# Patient Record
Sex: FEMALE | Race: BLACK OR AFRICAN AMERICAN | Employment: FULL TIME | URBAN - METROPOLITAN AREA
[De-identification: names, ages, dates, MRNs, and addresses within clinical notes are randomized per-mention and may not be internally consistent; named-entity substitution may affect disease eponyms.]

---

## 2020-11-06 ENCOUNTER — HOSPITAL ENCOUNTER (EMERGENCY)
Age: 25
Discharge: HOME | End: 2020-11-06
Attending: STUDENT IN AN ORGANIZED HEALTH CARE EDUCATION/TRAINING PROGRAM
Payer: MEDICAID

## 2020-11-06 ENCOUNTER — APPOINTMENT (OUTPATIENT)
Dept: GENERAL RADIOLOGY | Age: 25
End: 2020-11-06
Payer: MEDICAID

## 2020-11-06 VITALS
DIASTOLIC BLOOD PRESSURE: 85 MMHG | SYSTOLIC BLOOD PRESSURE: 121 MMHG | HEIGHT: 67 IN | TEMPERATURE: 98 F | OXYGEN SATURATION: 100 % | WEIGHT: 213 LBS | BODY MASS INDEX: 33.43 KG/M2 | HEART RATE: 62 BPM | RESPIRATION RATE: 16 BRPM

## 2020-11-06 LAB
RAPID INFLUENZA  B AGN: NEGATIVE
RAPID INFLUENZA A AGN: NEGATIVE

## 2020-11-06 PROCEDURE — 6370000000 HC RX 637 (ALT 250 FOR IP): Performed by: STUDENT IN AN ORGANIZED HEALTH CARE EDUCATION/TRAINING PROGRAM

## 2020-11-06 PROCEDURE — 87804 INFLUENZA ASSAY W/OPTIC: CPT

## 2020-11-06 PROCEDURE — 71045 X-RAY EXAM CHEST 1 VIEW: CPT

## 2020-11-06 PROCEDURE — 99283 EMERGENCY DEPT VISIT LOW MDM: CPT

## 2020-11-06 PROCEDURE — 2580000003 HC RX 258: Performed by: STUDENT IN AN ORGANIZED HEALTH CARE EDUCATION/TRAINING PROGRAM

## 2020-11-06 RX ORDER — BENZONATATE 100 MG/1
100 CAPSULE ORAL ONCE
Status: COMPLETED | OUTPATIENT
Start: 2020-11-06 | End: 2020-11-06

## 2020-11-06 RX ORDER — 0.9 % SODIUM CHLORIDE 0.9 %
1000 INTRAVENOUS SOLUTION INTRAVENOUS ONCE
Status: COMPLETED | OUTPATIENT
Start: 2020-11-06 | End: 2020-11-06

## 2020-11-06 RX ORDER — ACETAMINOPHEN 325 MG/1
650 TABLET ORAL ONCE
Status: COMPLETED | OUTPATIENT
Start: 2020-11-06 | End: 2020-11-06

## 2020-11-06 RX ADMIN — BENZONATATE 100 MG: 100 CAPSULE ORAL at 04:10

## 2020-11-06 RX ADMIN — ACETAMINOPHEN 650 MG: 325 TABLET ORAL at 04:10

## 2020-11-06 RX ADMIN — SODIUM CHLORIDE 1000 ML: 9 INJECTION, SOLUTION INTRAVENOUS at 04:10

## 2020-11-06 ASSESSMENT — ENCOUNTER SYMPTOMS
BACK PAIN: 0
SHORTNESS OF BREATH: 1
NAUSEA: 0
PHOTOPHOBIA: 0
RHINORRHEA: 0
STRIDOR: 0
VOMITING: 0
COUGH: 1
SORE THROAT: 0
ABDOMINAL PAIN: 0

## 2020-11-06 ASSESSMENT — PAIN SCALES - GENERAL: PAINLEVEL_OUTOF10: 8

## 2020-11-06 ASSESSMENT — PAIN DESCRIPTION - LOCATION: LOCATION: HEAD

## 2020-11-06 ASSESSMENT — PAIN DESCRIPTION - PAIN TYPE: TYPE: ACUTE PAIN

## 2020-11-06 NOTE — ED PROVIDER NOTES
[address] ED  EMERGENCY DEPARTMENT ENCOUNTER      Pt Name: Lonnie John  MRN: [id number]  [place] 1995  Date of evaluation: 11/6/2020  Provider: Pio Gardner, [address] [place] [address]       Chief Complaint   Patient presents with    Cough     c/o cough for 24 hours, itchy throat and a headache.  Headache         HISTORY OF PRESENT ILLNESS   (Location/Symptom, Timing/Onset, Context/Setting, Quality, Duration, Modifying Factors, Severity)  Note limiting factors. Lonnie John is a 30 y.o. female who presents to the emergency department complaining of viral syndrome. Patient presenting with roughly 3 4 hours of generalized body aches, arthralgias, cough, shortness of breath, fevers with a high of T-max 103, headache, sore throat. No known Covid exposure. Patient states that she woke up tonight from sleep with severe fevers and worsening of her symptoms which provoked her to present to ER for evaluation. Patient denying chest pain abdominal pain nausea vomiting or diarrhea. Patient is also complaining of headache. Headache is worsened with coughing spells. Not worst headache of life, no vision change no focal deficit. Not thunderclap in presentation. Nursing Notes were reviewed. PAST MEDICAL HISTORY   History reviewed. No pertinent past medical history. SURGICAL HISTORY     History reviewed. No pertinent surgical history. CURRENT MEDICATIONS       Previous Medications    IBUPROFEN (ADVIL;MOTRIN) 600 MG TABLET    Take 1 tablet by mouth every 6 hours as needed for Pain       ALLERGIES     Patient has no known allergies. FAMILY HISTORY     History reviewed. No pertinent family history.        SOCIAL HISTORY       Social History     Socioeconomic History    Marital status: Single     Spouse name: None    Number of children: None    Years of education: None    Highest education level: None   Occupational History    None   Social Needs    Financial resource strain: None    Food insecurity     Worry: None     Inability: None    Transportation needs     Medical: None     Non-medical: None   Tobacco Use    Smoking status: Never Smoker    Smokeless tobacco: Never Used   Substance and Sexual Activity    Alcohol use: Yes     Comment: occasional    Drug use: No    Sexual activity: None   Lifestyle    Physical activity     Days per week: None     Minutes per session: None    Stress: None   Relationships    Social connections     Talks on phone: None     Gets together: None     Attends Buddhist service: None     Active member of club or organization: None     Attends meetings of clubs or organizations: None     Relationship status: None    Intimate partner violence     Fear of current or ex partner: None     Emotionally abused: None     Physically abused: None     Forced sexual activity: None   Other Topics Concern    None   Social History Narrative    None       SCREENINGS                            REVIEW OF SYSTEMS    (2-9 systems for level 4, 10 or more for level 5)   Review of Systems   Constitutional: Positive for chills, diaphoresis, fatigue and fever. HENT: Positive for congestion. Negative for rhinorrhea and sore throat. Eyes: Negative for photophobia and visual disturbance. Respiratory: Positive for cough and shortness of breath. Negative for stridor. Cardiovascular: Negative for chest pain, palpitations and leg swelling. Gastrointestinal: Negative for abdominal pain, nausea and vomiting. Genitourinary: Negative for decreased urine volume. Musculoskeletal: Positive for arthralgias and myalgias. Negative for back pain, neck pain and neck stiffness. Skin: Negative for rash. Allergic/Immunologic: Negative for environmental allergies. Neurological: Positive for headaches. Hematological: Negative for adenopathy. Psychiatric/Behavioral: Negative for confusion.          PHYSICAL EXAM    (up to 7 for level 4, 8 or more for level 5)     ED Triage Vitals [11/06/20 0338]   BP Temp Temp Source Pulse Resp SpO2 Height Weight   119/81 98 °F (36.7 °C) Oral 65 16 100 % 5' 7\" (1.702 m) 213 lb (96.6 kg)       Physical Exam  Constitutional:       General: She is not in acute distress. Appearance: She is not diaphoretic. HENT:      Head: Normocephalic and atraumatic. Eyes:      Pupils: Pupils are equal, round, and reactive to light. Neck:      Musculoskeletal: Normal range of motion and neck supple. Trachea: No tracheal deviation. Cardiovascular:      Rate and Rhythm: Normal rate and regular rhythm. Pulmonary:      Effort: Pulmonary effort is normal. No respiratory distress. Abdominal:      General: There is no distension. Palpations: Abdomen is soft. Musculoskeletal: Normal range of motion. Skin:     General: Skin is warm. Neurological:      Mental Status: She is oriented to person, place, and time. DIAGNOSTIC RESULTS     EKG: All EKG's are interpreted by the Emergency Department Physician who either signs or Co-signs this chart in the absence of a cardiologist.        RADIOLOGY:   Non-plain film images such as CT, Ultrasound and MRI are read by the radiologist. Plain radiographic images are visualized and preliminarily interpreted by the emergency physician. Interpretation per the Radiologist below, if available at the time of this note:    XR CHEST PORTABLE   Final Result   Clear lungs. LABS:  Labs Reviewed   RAPID INFLUENZA A/B ANTIGENS    Narrative:     Performed at:  [facility]) [facility],  [place], [facility]   Phone [phone]       All other labs were within normal range or not returned as of this dictation. EMERGENCY DEPARTMENT COURSE and DIFFERENTIAL DIAGNOSIS/MDM:   Katherine Sorenson is a 30 y.o. female who presents to the emergency department with the complaint of viral type syndrome.   Patient's vital signs are stable on arrival otherwise she is well-appearing in room. Heart regular rhythm lungs are clear to auscultation abdomen soft nontender. Complains of mild generalized headache, likely secondary to coughing spells per patient. Denies vision change, normal strength normal sensation upper lower extremities, not worse like of life not are common presentation, low suspicion for meningitis as patient is afebrile well-appearing with no neck rigidity, negative Kernig's emergency signs, nonfocal neuro exam.  Low suspicion for subarachnoid hemorrhage based on presentation of headache symptoms. Impression is likely viral syndrome, concern for possible Covid infection versus influenza, patient does not have chronic respiratory syndrome. Will screen for Covid, influenza, treat IV fluids, Tylenol, Tessalon. Patient did not tolerate Covid testing and then refused repeat test.  Influenza was negative. Chest x-ray without signs of pneumonia, pneumothorax, pulmonary edema. Patient otherwise well-appearing here now with stable vitals. Impression is likely suspected Covid infection, patient was counseled on quarantine procedures, was given written and verbal return precautions for coronavirus. CRITICAL CARE TIME   Total Critical Care time was 0 minutes, excluding separately reportable procedures. There was a high probability of clinically significant/life threatening deterioration in the patient's condition which required my urgent intervention. Clinical concern   Intervention     CONSULTS:  None    PROCEDURES:  Unless otherwise noted below, none     Procedures        FINAL IMPRESSION      1.  Suspected COVID-19 virus infection          DISPOSITION/PLAN   DISPOSITION  [place]      PATIENT REFERRED TO:  [place] [facility] [place] [facility] [place] [facility]  [address] [facility]  [phone]    If symptoms worsen      DISCHARGE MEDICATIONS:  New Prescriptions    No medications on file     Controlled Substances Monitoring:     No flowsheet data found.     (Please note that portions of this note were completed with a voice recognition program.  Efforts were made to edit the dictations but occasionally words are mis-transcribed.)    Juanjo Sahu DO (electronically signed)  Attending Emergency Physician            Juanjo Sahu DO  11/06/20 6572

## 2020-11-06 NOTE — ED NOTES
Bed: 03  Expected date:   Expected time:   Means of arrival:   Comments:  [facility] [place] [place], RN  11/06/20 9111

## 2020-11-07 ENCOUNTER — CARE COORDINATION (OUTPATIENT)
Dept: CARE COORDINATION | Age: 25
End: 2020-11-07

## 2020-11-07 NOTE — CARE COORDINATION
Patient contacted regarding Jg Leos. Discussed COVID-19 related testing which was pending at this time. Test results were pending. Patient informed of results, if available? Pt reports she was tested. Test Results not back at this time. Care Transition Nurse/ Ambulatory Care Manager contacted the patient by telephone to perform post discharge assessment. Call within 2 business days of discharge: Yes. Verified name and  with patient as identifiers. Provided introduction to self, and explanation of the CTN/ACM role, and reason for call due to risk factors for infection and/or exposure to COVID-19. Symptoms reviewed with patient who verbalized the following symptoms: fever, fatigue, cough, shortness of breath, loss of taste or smell, no new symptoms, no worsening symptoms and decreased UOP. .      Due to new onset of symptoms encounter was not routed to provider for escalation. Discussed follow-up appointments. If no appointment was previously scheduled, appointment scheduling offered: Yes  [facility] follow up appointment(s): No future appointments. Non-[facility] follow up appointment(s):     Non-face-to-face services provided:  Pt provided with Nurse Triage line to call if needed. Advance Care Planning:   Does patient have an Advance Directive:  reviewed and current. Patient has following risk factors of: no known risk factors. CTN/ACM reviewed discharge instructions, medical action plan and red flags such as increased shortness of breath, increasing fever and signs of decompensation with patient who verbalized understanding. Discussed exposure protocols and quarantine with CDC Guidelines What to do if you are sick with coronavirus disease 2019.  Patient was given an opportunity for questions and concerns.  The patient agrees to contact the [facility] exposure line [phone], [facility]: ([phone]) and PCP office for questions related to their healthcare. CTN/ACM provided contact information for future needs. Reviewed and educated patient on any new and changed medications related to discharge diagnosis     Patient/family/caregiver given information for Get[employer] Loop and agrees to enroll no  Patient's preferred e-mail:    Patient's preferred phone number:   Based on Loop alert triggers, patient will be contacted by nurse care manager for worsening symptoms. Plan for follow-up call in 1-2 days based on severity of symptoms and risk factors. Pt reports she has been asleep since she returned home from the ED last night. She reports her symptoms are the same and now she is unable to taste or smell anything. She also reports she has a decrease in her UOP, as she has not voided since she has been home from the ED (or around midnight last night). ACM encouraged pt to increase her PO fluid intake, pt agreed to do so. ACM educated pt on s/s of dehydration including dry/tacky mouth, H/A, decrease UOP. If pt continues with decrease UOP, ACM encouraged her to call the Nurse Triage Line. Pt agreed with this plan. Pt did not have any other questions for ACM at this time.

## 2020-11-16 ENCOUNTER — CARE COORDINATION (OUTPATIENT)
Dept: CARE COORDINATION | Age: 25
End: 2020-11-16

## 2020-11-16 NOTE — CARE COORDINATION
You Patient resolved from the Care Transitions episode on 11.16.20  Discussed COVID-19 related testing which was No test result found under labs. at this time. Test results were see note above. . Patient informed of results, if available? See note above. Patient/family has been provided the following resources and education related to COVID-19:                         Signs, symptoms and red flags related to COVID-19            [facility] exposure and quarantine guidelines            [facility] exposure contact - [phone]            Contact for their local Department of Health                 Patient currently reports that the following symptoms have improved:  no new/worsening symptoms     No further outreach scheduled with this CTN/ACM. Episode of Care resolved. Patient has this CTN/ACM contact information if future needs arise. Pt reports she feels \"so much better\". She stated she has a f/u appointment with her PCP tomorrow, 11.17.20. She did not have any questions/concerns for ACM today.

## 2021-06-29 ENCOUNTER — HOSPITAL ENCOUNTER (EMERGENCY)
Age: 26
Discharge: LEFT BEFORE DISCHARGE | End: 2021-06-29
Payer: MEDICAID

## 2021-06-29 VITALS
RESPIRATION RATE: 16 BRPM | HEART RATE: 89 BPM | OXYGEN SATURATION: 99 % | TEMPERATURE: 98 F | SYSTOLIC BLOOD PRESSURE: 113 MMHG | DIASTOLIC BLOOD PRESSURE: 80 MMHG

## 2021-06-29 DIAGNOSIS — Z53.29 LEFT AGAINST MEDICAL ADVICE: Primary | ICD-10-CM

## 2021-06-29 DIAGNOSIS — R10.2 SUPRAPUBIC ABDOMINAL PAIN: ICD-10-CM

## 2021-06-29 LAB
A/G RATIO: 1.5 (ref 1.1–2.2)
ALBUMIN SERPL-MCNC: 4.8 G/DL (ref 3.4–5)
ALP BLD-CCNC: 74 U/L (ref 40–129)
ALT SERPL-CCNC: 17 U/L (ref 10–40)
ANION GAP SERPL CALCULATED.3IONS-SCNC: 10 MMOL/L (ref 3–16)
AST SERPL-CCNC: 24 U/L (ref 15–37)
BASOPHILS ABSOLUTE: 0.1 K/UL (ref 0–0.2)
BASOPHILS RELATIVE PERCENT: 0.9 %
BILIRUB SERPL-MCNC: 0.5 MG/DL (ref 0–1)
BILIRUBIN URINE: NEGATIVE
BLOOD, URINE: NEGATIVE
BUN BLDV-MCNC: 14 MG/DL (ref 7–20)
CALCIUM SERPL-MCNC: 9.9 MG/DL (ref 8.3–10.6)
CHLORIDE BLD-SCNC: 102 MMOL/L (ref 99–110)
CLARITY: CLEAR
CO2: 25 MMOL/L (ref 21–32)
COLOR: YELLOW
CREAT SERPL-MCNC: 0.8 MG/DL (ref 0.6–1.1)
EOSINOPHILS ABSOLUTE: 0.2 K/UL (ref 0–0.6)
EOSINOPHILS RELATIVE PERCENT: 2.2 %
GFR AFRICAN AMERICAN: >60
GFR NON-AFRICAN AMERICAN: >60
GLOBULIN: 3.2 G/DL
GLUCOSE BLD-MCNC: 58 MG/DL (ref 70–99)
GLUCOSE URINE: NEGATIVE MG/DL
HCG(URINE) PREGNANCY TEST: NEGATIVE
HCT VFR BLD CALC: 46.5 % (ref 36–48)
HEMOGLOBIN: 15.5 G/DL (ref 12–16)
KETONES, URINE: NEGATIVE MG/DL
LEUKOCYTE ESTERASE, URINE: NEGATIVE
LIPASE: 56 U/L (ref 13–60)
LYMPHOCYTES ABSOLUTE: 3 K/UL (ref 1–5.1)
LYMPHOCYTES RELATIVE PERCENT: 39.7 %
MCH RBC QN AUTO: 31.6 PG (ref 26–34)
MCHC RBC AUTO-ENTMCNC: 33.3 G/DL (ref 31–36)
MCV RBC AUTO: 94.8 FL (ref 80–100)
MICROSCOPIC EXAMINATION: NORMAL
MONOCYTES ABSOLUTE: 0.4 K/UL (ref 0–1.3)
MONOCYTES RELATIVE PERCENT: 5.3 %
NEUTROPHILS ABSOLUTE: 3.9 K/UL (ref 1.7–7.7)
NEUTROPHILS RELATIVE PERCENT: 51.9 %
NITRITE, URINE: NEGATIVE
PDW BLD-RTO: 13.3 % (ref 12.4–15.4)
PH UA: 6 (ref 5–8)
PLATELET # BLD: 337 K/UL (ref 135–450)
PMV BLD AUTO: 8.6 FL (ref 5–10.5)
POTASSIUM REFLEX MAGNESIUM: 4.4 MMOL/L (ref 3.5–5.1)
PROTEIN UA: NEGATIVE MG/DL
RBC # BLD: 4.91 M/UL (ref 4–5.2)
SODIUM BLD-SCNC: 137 MMOL/L (ref 136–145)
SPECIFIC GRAVITY UA: >=1.03 (ref 1–1.03)
TOTAL PROTEIN: 8 G/DL (ref 6.4–8.2)
URINE REFLEX TO CULTURE: NORMAL
URINE TYPE: NORMAL
UROBILINOGEN, URINE: 0.2 E.U./DL
WBC # BLD: 7.5 K/UL (ref 4–11)

## 2021-06-29 PROCEDURE — 4500000002 HC ER NO CHARGE

## 2021-06-29 PROCEDURE — 84703 CHORIONIC GONADOTROPIN ASSAY: CPT

## 2021-06-29 PROCEDURE — 81003 URINALYSIS AUTO W/O SCOPE: CPT

## 2021-06-29 PROCEDURE — 85025 COMPLETE CBC W/AUTO DIFF WBC: CPT

## 2021-06-29 PROCEDURE — 80053 COMPREHEN METABOLIC PANEL: CPT

## 2021-06-29 PROCEDURE — 83690 ASSAY OF LIPASE: CPT

## 2021-06-29 PROCEDURE — 99283 EMERGENCY DEPT VISIT LOW MDM: CPT

## 2021-06-29 ASSESSMENT — PAIN DESCRIPTION - LOCATION: LOCATION: GROIN;BUTTOCKS

## 2021-06-29 ASSESSMENT — PAIN SCALES - GENERAL: PAINLEVEL_OUTOF10: 10

## 2021-06-29 NOTE — ED PROVIDER NOTES
Evaluated by [id number] [facility] Provider          [facility] ED  EMERGENCY DEPARTMENT ENCOUNTER        Pt Name: Roger Henning  MRN: [id number]  [place] 1995  Dateof evaluation: 6/29/2021  Provider: JESÚS Hurtado CNP  [facility]P: [facility] ([facility])  ED Attending: No att. providers found    [address] [place] [address]       Chief Complaint   Patient presents with    Abdominal Pain     cramping/pain lower abd/back. headaches,       HISTORY OF PRESENTILLNESS   (Location/Symptom, Timing/Onset, Context/Setting, Quality, Duration, Modifying Factors, Severity)  Note limiting factors. Roger Henning is a 30 y.o. female for lower abdominal pain. Onset was today. Context includes patient reports that she started having lower abdominal cramping today. She denies any injury. Patient has no fever nausea vomiting or diarrhea. Patient has no  or GYN symptoms. Alleviating factors include nothing. Aggravating factors include nothing. Pain is 8/10. Ibuprofen has been used for pain today. Nursing Notes were all reviewed and agreed with or any disagreements were addressed  in the HPI. REVIEW OF SYSTEMS    (2-9 systems for level 4, 10 or more for level 5)     Review of Systems   Constitutional: Negative for fever. HENT: Negative for congestion, rhinorrhea and sore throat. Respiratory: Negative for shortness of breath. Cardiovascular: Negative for chest pain. Gastrointestinal: Positive for abdominal pain. Negative for diarrhea, nausea and vomiting. Genitourinary: Negative for decreased urine volume, difficulty urinating, vaginal bleeding, vaginal discharge and vaginal pain. Musculoskeletal: Negative for arthralgias and myalgias. Skin: Negative for color change and rash. Neurological: Negative for dizziness and light-headedness. Psychiatric/Behavioral: Negative for agitation. All other systems reviewed and are negative.       Positives and Pertinent negatives as per HPI.  Except as noted above in the ROS, all other systems were reviewed and negative. PAST MEDICAL HISTORY   No past medical history on file. SURGICAL HISTORY     No past surgical history on file. CURRENT MEDICATIONS       Discharge Medication List as of 6/29/2021  9:23 PM      CONTINUE these medications which have NOT CHANGED    Details   ibuprofen (ADVIL;MOTRIN) 600 MG tablet Take 1 tablet by mouth every 6 hours as needed for Pain, Disp-40 tablet, R-0Print               ALLERGIES     Patient has no known allergies. FAMILY HISTORY     No family history on file. SOCIAL HISTORY       Social History     Socioeconomic History    Marital status: Single     Spouse name: Not on file    Number of children: Not on file    Years of education: Not on file    Highest education level: Not on file   Occupational History    Not on file   Tobacco Use    Smoking status: Never Smoker    Smokeless tobacco: Never Used   Substance and Sexual Activity    Alcohol use: Yes     Comment: occasional    Drug use: No    Sexual activity: Not on file   Other Topics Concern    Not on file   Social History Narrative    Not on file     Social Determinants of Health     Financial Resource Strain:     Difficulty of Paying Living Expenses:    Food Insecurity:     Worried About Running Out of Food in the Last Year:     [address] in the Last Year:    Transportation Needs:     Lack of Transportation (Medical):      Lack of Transportation (Non-Medical):    Physical Activity:     Days of Exercise per Week:     Minutes of Exercise per Session:    Stress:     Feeling of Stress :    Social Connections:     Frequency of Communication with Friends and Family:     Frequency of Social Gatherings with Friends and Family:     Attends Restorationism Services:     Active Member of Clubs or Organizations:     Attends Club or Organization Meetings:     Marital Status:    Intimate Partner Violence:     Fear of Current or Ex-Partner:     Emotionally Abused:     Physically Abused:     Sexually Abused:        SCREENINGS             PHYSICAL EXAM  (up to 7 for level 4, 8 or more for level 5)     ED Triage Vitals [06/29/21 1805]   BP Temp Temp Source Pulse Resp SpO2 Height Weight   113/80 98 °F (36.7 °C) Tympanic 89 16 99 % -- --       Physical Exam  Constitutional:       Appearance: She is well-developed. She is obese. HENT:      Head: Normocephalic and atraumatic. Cardiovascular:      Rate and Rhythm: Normal rate. Pulmonary:      Effort: Pulmonary effort is normal. No respiratory distress. Abdominal:      General: There is no distension. Palpations: Abdomen is soft. Tenderness: There is abdominal tenderness. Comments: Generalized lower abdominal cramping. No focal tenderness   Musculoskeletal:         General: Normal range of motion. Cervical back: Normal range of motion. Skin:     General: Skin is warm and dry. Neurological:      Mental Status: She is alert and oriented to person, place, and time.          DIAGNOSTIC RESULTS   LABS:    Labs Reviewed   COMPREHENSIVE METABOLIC PANEL W/ REFLEX TO MG FOR LOW K - Abnormal; Notable for the following components:       Result Value    Glucose 58 (*)     All other components within normal limits    Narrative:     Performed at:  [facility],  [facility]   Phone [phone]   WET PREP, GENITAL   CBC WITH AUTO DIFFERENTIAL    Narrative:     Performed at:  [facility],  [email or web address], [facility]   Phone [phone]   URINE RT REFLEX TO CULTURE    Narrative:     Performed at:  [facility] [address],  [place], [facility]   Phone [phone]   LIPASE    Narrative:     Performed at:  [place] [address],  [place], [facility]   Phone [phone]   PREGNANCY, URINE Narrative:     Performed at:  [facility]  [address],  [place], [place]   Phone [phone]       All other labs werewithin normal range or not returned as of this dictation. EKG: All EKG's are interpreted by the Emergency Department Physician who either signs or Co-signs this chart in the absence of a cardiologist.  Please see their note for interpretation of EKG. RADIOLOGY:           Interpretation per the Radiologist below, if available at the time of this note:    No orders to display     No results found. PROCEDURES   Unless otherwise noted below, none     Procedures     CRITICAL CARE TIME   N/A    CONSULTS:  None      EMERGENCYDEPARTMENT COURSE and DIFFERENTIAL DIAGNOSIS/MDM:   Vitals:    Vitals:    06/29/21 1805   BP: 113/80   Pulse: 89   Resp: 16   Temp: 98 °F (36.7 °C)   TempSrc: Tympanic   SpO2: 99%       Patient was given the following medications:  Medications - No data to display  Patient was seen and evaluated by myself. Patient here for complaints of lower abdominal cramping. On exam she is awake and alert hemodynamically stable nontoxic in appearance. Lab values were obtained the patient was found to have a blood sugar of 58. She was provided with food and drink here in the ED. During the visit the patient elected to leave [address] Patient was able to make her own decisions and was given the risks benefits patient was also provided with a GI referral as well. Of leaving. Patient was encouraged to return to the ED for worsening symptoms. Patient was also encouraged to follow-up with a PCP and was provided with a referral. She was also given a GYN referral. Patient ultimately left the department [address] The patient tolerated their visit well. I have evaluated this patient. My supervising physician was available for consultation.  The patient and / or the family were informed of the results of any tests, a time was given to answer questions, a plan was proposed and they agreed with plan. FINAL IMPRESSION      1. Left against medical advice    2.  Suprapubic abdominal pain          DISPOSITION/PLAN   DISPOSITION [place] 06/29/2021 09:21:00 PM      PATIENT REFERRED TO:  [facility]) Pre-Services  [phone]  Schedule an appointment as soon as possible for a visit in 2 days  for re-evaluation    *[facility] [address] [facility] [address] [place] [address]  [phone]    Schedule an appointment as soon as possible for a visit   to establish primary care    Mita Teague DO  [address] [place] [address] [phone]    Call   as instructed, for re-evaluation      DISCHARGE MEDICATIONS:  Discharge Medication List as of 6/29/2021  9:23 PM          DISCONTINUED MEDICATIONS:  Discharge Medication List as of 6/29/2021  9:23 PM                 (Please note that portions of this note were completed with a voice recognition program.  Efforts were made to edit the dictations but occasionally words are mis-transcribed.)    JESÚS Medrano CNP (electronically signed)         JESÚS Medrano CNP  07/07/21 0510

## 2021-06-30 NOTE — ED NOTES
Ksah Ritter came out of pt room and told me that pt is refusing to sign an AMA until she gets a copy of her results. I see we have blood and urine results in computer and have attempted to print off copy of labs with success from 3 different computers. Another nurse also tried to print off lab results but unsuccessful. I told pt this and showed her on her discharge paperwork where she could sign up for My Chart and get results. Pt making comments as she leave room that she would never come back here. Pt shown how to get out of department to lobby by me.       Gabo Hess RN  06/29/21 2087

## 2021-06-30 NOTE — ED NOTES
[facility] reports pt wants to see her nurse. I went in to check on pt because pt nurse is in another room. Pt upset and sts that \"You all came in her and didn't do anything, this is taking way too long, I have been here for 5 hours and you havent even offered me a drink or nothing. This is taking way too long and I am going to leave. \" I notified Krystle Acuña NP that pt was wanting to leave and she is going in to talk to pt.       Saulo Landin RN  06/29/21 0460

## 2021-06-30 NOTE — ED NOTES
Pt asked to get into gown for pelvic exam. Josselin Matters, NP aware that pt will be ready for exam.      Wanda Mata RN  06/29/21 7079

## 2021-07-07 ASSESSMENT — ENCOUNTER SYMPTOMS
RHINORRHEA: 0
SORE THROAT: 0
VOMITING: 0
DIARRHEA: 0
COLOR CHANGE: 0
ABDOMINAL PAIN: 1
NAUSEA: 0
SHORTNESS OF BREATH: 0

## 2021-09-14 ENCOUNTER — HOSPITAL ENCOUNTER (EMERGENCY)
Age: 26
Discharge: HOME | End: 2021-09-14
Attending: EMERGENCY MEDICINE
Payer: MEDICAID

## 2021-09-14 VITALS
DIASTOLIC BLOOD PRESSURE: 72 MMHG | HEART RATE: 82 BPM | SYSTOLIC BLOOD PRESSURE: 124 MMHG | RESPIRATION RATE: 16 BRPM | OXYGEN SATURATION: 100 % | TEMPERATURE: 97.5 F | WEIGHT: 206 LBS | BODY MASS INDEX: 30.51 KG/M2 | HEIGHT: 69 IN

## 2021-09-14 DIAGNOSIS — O21.9 NAUSEA/VOMITING IN PREGNANCY: Primary | ICD-10-CM

## 2021-09-14 LAB
A/G RATIO: 1.5 (ref 1.1–2.2)
ALBUMIN SERPL-MCNC: 4.5 G/DL (ref 3.4–5)
ALP BLD-CCNC: 61 U/L (ref 40–129)
ALT SERPL-CCNC: 14 U/L (ref 10–40)
ANION GAP SERPL CALCULATED.3IONS-SCNC: 10 MMOL/L (ref 3–16)
AST SERPL-CCNC: 15 U/L (ref 15–37)
BASOPHILS ABSOLUTE: 0 K/UL (ref 0–0.2)
BASOPHILS RELATIVE PERCENT: 0.7 %
BILIRUB SERPL-MCNC: 0.7 MG/DL (ref 0–1)
BILIRUBIN URINE: NEGATIVE
BLOOD, URINE: NEGATIVE
BUN BLDV-MCNC: 8 MG/DL (ref 7–20)
CALCIUM SERPL-MCNC: 9.4 MG/DL (ref 8.3–10.6)
CHLORIDE BLD-SCNC: 105 MMOL/L (ref 99–110)
CLARITY: CLEAR
CO2: 20 MMOL/L (ref 21–32)
COLOR: YELLOW
CREAT SERPL-MCNC: 0.7 MG/DL (ref 0.6–1.1)
EOSINOPHILS ABSOLUTE: 0.1 K/UL (ref 0–0.6)
EOSINOPHILS RELATIVE PERCENT: 2.2 %
GFR AFRICAN AMERICAN: >60
GFR NON-AFRICAN AMERICAN: >60
GLOBULIN: 3.1 G/DL
GLUCOSE BLD-MCNC: 94 MG/DL (ref 70–99)
GLUCOSE URINE: NEGATIVE MG/DL
GONADOTROPIN, CHORIONIC (HCG) QUANT: 9488 MIU/ML
HCT VFR BLD CALC: 43 % (ref 36–48)
HEMOGLOBIN: 14.4 G/DL (ref 12–16)
KETONES, URINE: ABNORMAL MG/DL
LEUKOCYTE ESTERASE, URINE: NEGATIVE
LYMPHOCYTES ABSOLUTE: 1.7 K/UL (ref 1–5.1)
LYMPHOCYTES RELATIVE PERCENT: 28.5 %
MCH RBC QN AUTO: 31.5 PG (ref 26–34)
MCHC RBC AUTO-ENTMCNC: 33.6 G/DL (ref 31–36)
MCV RBC AUTO: 94 FL (ref 80–100)
MICROSCOPIC EXAMINATION: ABNORMAL
MONOCYTES ABSOLUTE: 0.5 K/UL (ref 0–1.3)
MONOCYTES RELATIVE PERCENT: 7.4 %
NEUTROPHILS ABSOLUTE: 3.8 K/UL (ref 1.7–7.7)
NEUTROPHILS RELATIVE PERCENT: 61.2 %
NITRITE, URINE: NEGATIVE
PDW BLD-RTO: 13.4 % (ref 12.4–15.4)
PH UA: 6 (ref 5–8)
PLATELET # BLD: 343 K/UL (ref 135–450)
PMV BLD AUTO: 8.1 FL (ref 5–10.5)
POTASSIUM REFLEX MAGNESIUM: 3.7 MMOL/L (ref 3.5–5.1)
PROTEIN UA: NEGATIVE MG/DL
RBC # BLD: 4.57 M/UL (ref 4–5.2)
SODIUM BLD-SCNC: 135 MMOL/L (ref 136–145)
SPECIFIC GRAVITY UA: 1.02 (ref 1–1.03)
TOTAL PROTEIN: 7.6 G/DL (ref 6.4–8.2)
URINE TYPE: ABNORMAL
UROBILINOGEN, URINE: 1 E.U./DL
WBC # BLD: 6.1 K/UL (ref 4–11)

## 2021-09-14 PROCEDURE — 6360000002 HC RX W HCPCS: Performed by: EMERGENCY MEDICINE

## 2021-09-14 PROCEDURE — 99283 EMERGENCY DEPT VISIT LOW MDM: CPT

## 2021-09-14 PROCEDURE — 87086 URINE CULTURE/COLONY COUNT: CPT

## 2021-09-14 PROCEDURE — 81003 URINALYSIS AUTO W/O SCOPE: CPT

## 2021-09-14 PROCEDURE — 2580000003 HC RX 258: Performed by: EMERGENCY MEDICINE

## 2021-09-14 PROCEDURE — 85025 COMPLETE CBC W/AUTO DIFF WBC: CPT

## 2021-09-14 PROCEDURE — 96374 THER/PROPH/DIAG INJ IV PUSH: CPT

## 2021-09-14 PROCEDURE — 80053 COMPREHEN METABOLIC PANEL: CPT

## 2021-09-14 PROCEDURE — 84702 CHORIONIC GONADOTROPIN TEST: CPT

## 2021-09-14 RX ORDER — ONDANSETRON 4 MG/1
4 TABLET, ORALLY DISINTEGRATING ORAL EVERY 8 HOURS PRN
Qty: 21 TABLET | Refills: 0 | Status: SHIPPED | OUTPATIENT
Start: 2021-09-14 | End: 2021-09-21

## 2021-09-14 RX ORDER — 0.9 % SODIUM CHLORIDE 0.9 %
1000 INTRAVENOUS SOLUTION INTRAVENOUS ONCE
Status: COMPLETED | OUTPATIENT
Start: 2021-09-14 | End: 2021-09-14

## 2021-09-14 RX ORDER — ONDANSETRON 2 MG/ML
4 INJECTION INTRAMUSCULAR; INTRAVENOUS ONCE
Status: COMPLETED | OUTPATIENT
Start: 2021-09-14 | End: 2021-09-14

## 2021-09-14 RX ORDER — VITAMIN A ACETATE, BETA CAROTENE, ASCORBIC ACID, CHOLECALCIFEROL, .ALPHA.-TOCOPHEROL ACETATE, DL-, THIAMINE MONONITRATE, RIBOFLAVIN, NIACINAMIDE, PYRIDOXINE HYDROCHLORIDE, FOLIC ACID, CYANOCOBALAMIN, CALCIUM CARBONATE, FERROUS FUMARATE, ZINC OXIDE, CUPRIC OXIDE 3080; 12; 120; 400; 1; 1.84; 3; 20; 22; 920; 25; 200; 27; 10; 2 [IU]/1; UG/1; MG/1; [IU]/1; MG/1; MG/1; MG/1; MG/1; MG/1; [IU]/1; MG/1; MG/1; MG/1; MG/1; MG/1
1 TABLET, FILM COATED ORAL DAILY
COMMUNITY

## 2021-09-14 RX ADMIN — ONDANSETRON HYDROCHLORIDE 4 MG: 2 INJECTION, SOLUTION INTRAMUSCULAR; INTRAVENOUS at 08:11

## 2021-09-14 RX ADMIN — SODIUM CHLORIDE 1000 ML: 9 INJECTION, SOLUTION INTRAVENOUS at 08:11

## 2021-09-14 ASSESSMENT — ENCOUNTER SYMPTOMS
BACK PAIN: 0
NAUSEA: 1
ABDOMINAL PAIN: 0
CHOKING: 0
SHORTNESS OF BREATH: 0
DIARRHEA: 0
VOMITING: 1
COLOR CHANGE: 0

## 2021-09-14 ASSESSMENT — PAIN DESCRIPTION - PAIN TYPE: TYPE: ACUTE PAIN

## 2021-09-14 ASSESSMENT — PAIN SCALES - GENERAL: PAINLEVEL_OUTOF10: 8

## 2021-09-14 ASSESSMENT — PAIN DESCRIPTION - LOCATION: LOCATION: HEAD

## 2021-09-14 ASSESSMENT — PAIN DESCRIPTION - DESCRIPTORS: DESCRIPTORS: ACHING

## 2021-09-14 NOTE — ED PROVIDER NOTES
Emergency Department Provider Note  Location: [place] [address] ED  2021     Patient Identification  Raphael Healy is a 30 y.o. female    Chief Complaint  Emesis (c/o 6 weeks pregnant vomiting for 24 hrs. OB doc told to come to ER for dehydration)      Mode of Arrival  private car    HPI  (History provided by patient)  This is a 30 y.o. female  approx 6 weeks pregnant presented today for N/V that started yesterday. She reports vomiting yellow color liquid that is foul tasting. No bloody emesis. No diarrhea. No abdominal pain or vaginal bleeding. No fever. She does not have any nausea medicine at home. She denies prior history of hyperemesis gravidum with previous pregnancies. ROS  Review of Systems   Constitutional: Negative for chills and fever. HENT: Negative for congestion. Eyes: Negative for visual disturbance. Respiratory: Negative for choking and shortness of breath. Cardiovascular: Negative for chest pain. Gastrointestinal: Positive for nausea and vomiting. Negative for abdominal pain and diarrhea. Genitourinary: Negative for dysuria, frequency and vaginal bleeding. Musculoskeletal: Negative for back pain. Skin: Negative for color change. Neurological: Positive for light-headedness. Negative for syncope and speech difficulty. Psychiatric/Behavioral: Negative for confusion. I have reviewed the following nursing documentation:  Allergies: No Known Allergies    Past medical history:  has no past medical history on file. Past surgical history:  has no past surgical history on file. Home medications:   Prior to Admission medications    Medication Sig Start Date End Date Taking? Authorizing Provider   Prenatal Vit-Fe Fumarate-FA (PRENATAL PLUS) 27-1 MG TABS Take 1 tablet by mouth daily    Historical Provider, MD       Social history:  reports that she has never smoked. She has never used smokeless tobacco. She reports current alcohol use.  She reports that she does not use drugs. Family history:  History reviewed. No pertinent family history. Exam  ED Triage Vitals [09/14/21 0631]   BP Temp Temp Source Pulse Resp SpO2 Height Weight   124/72 97.5 °F (36.4 °C) Oral 82 16 100 % 5' 9\" (1.753 m) 206 lb (93.4 kg)   Physical Exam  Vitals and nursing note reviewed. Constitutional:       General: She is in acute distress (patient appeared uncomfortable due to nausea; no active vomiting). Appearance: Normal appearance. She is well-developed. She is not diaphoretic. HENT:      Head: Normocephalic and atraumatic. Eyes:      General: Lids are normal. No scleral icterus. Right eye: No discharge. Left eye: No discharge. Conjunctiva/sclera: Conjunctivae normal.   Neck:      Trachea: No tracheal deviation. Cardiovascular:      Rate and Rhythm: Normal rate and regular rhythm. Heart sounds: Normal heart sounds. No murmur heard. Pulmonary:      Effort: Pulmonary effort is normal. No respiratory distress. Breath sounds: Normal breath sounds. No stridor. No wheezing. Abdominal:      General: There is no distension. Palpations: Abdomen is soft. Tenderness: There is no abdominal tenderness. There is no guarding or rebound. Musculoskeletal:         General: No deformity. Cervical back: Neck supple. Skin:     General: Skin is warm and dry. Coloration: Skin is not pale. Findings: No rash. Neurological:      General: No focal deficit present. Mental Status: She is alert and oriented to person, place, and time. Cranial Nerves: No dysarthria or facial asymmetry. Sensory: No sensory deficit. Motor: Motor function is intact. No weakness or tremor.       Gait: Gait normal.   Psychiatric:         Speech: Speech normal.         Behavior: Behavior normal.           MDM/ED Course  ED Medication Orders (From admission, onward)    Start Ordered     Status Ordering Provider    09/14/21 0715 09/14/21 0710  0.9 % sodium chloride bolus  ONCE      Last MAR action: Stopped - by Valenciamor Anibal on 09/14/21 at 7173 [address] [facility]    09/14/21 0715 09/14/21 0710  ondansetron (ZOFRAN) injection 4 mg  ONCE      Last MAR action: Given - by Cely Villalpando on 09/14/21 at 0811 Trevor We[address]          Labs  Results for orders placed or performed during the hospital encounter of 09/14/21   CBC auto differential   Result Value Ref Range    WBC 6.1 4.0 - 11.0 K/uL    RBC 4.57 4.00 - 5.20 M/uL    Hemoglobin 14.4 12.0 - 16.0 g/dL    Hematocrit 43.0 36.0 - 48.0 %    MCV 94.0 80.0 - 100.0 fL    MCH 31.5 26.0 - 34.0 pg    MCHC 33.6 31.0 - 36.0 g/dL    RDW 13.4 12.4 - 15.4 %    Platelets 903 302 - 069 K/uL    MPV 8.1 5.0 - 10.5 fL    Neutrophils % 61.2 %    Lymphocytes % 28.5 %    Monocytes % 7.4 %    Eosinophils % 2.2 %    Basophils % 0.7 %    Neutrophils Absolute 3.8 1.7 - 7.7 K/uL    Lymphocytes Absolute 1.7 1.0 - 5.1 K/uL    Monocytes Absolute 0.5 0.0 - 1.3 K/uL    Eosinophils Absolute 0.1 0.0 - 0.6 K/uL    Basophils Absolute 0.0 0.0 - 0.2 K/uL   Comprehensive Metabolic Panel w/ Reflex to MG   Result Value Ref Range    Sodium 135 (L) 136 - 145 mmol/L    Potassium reflex Magnesium 3.7 3.5 - 5.1 mmol/L    Chloride 105 99 - 110 mmol/L    CO2 20 (L) 21 - 32 mmol/L    Anion Gap 10 3 - 16    Glucose 94 70 - 99 mg/dL    BUN 8 7 - 20 mg/dL    CREATININE 0.7 0.6 - 1.1 mg/dL    GFR Non-African American >60 >60    GFR African American >60 >60    Calcium 9.4 8.3 - 10.6 mg/dL    Total Protein 7.6 6.4 - 8.2 g/dL    Albumin 4.5 3.4 - 5.0 g/dL    Albumin/Globulin Ratio 1.5 1.1 - 2.2    Total Bilirubin 0.7 0.0 - 1.0 mg/dL    Alkaline Phosphatase 61 40 - 129 U/L    ALT 14 10 - 40 U/L    AST 15 15 - 37 U/L    Globulin 3.1 g/dL   hCG, quantitative, pregnancy   Result Value Ref Range    hCG Quant 9488.0 <5.0 mIU/mL   Urinalysis, reflex to microscopic   Result Value Ref Range    Color, UA Yellow Straw/Yellow    Clarity, UA Clear Clear    Glucose, Ur Negative Negative mg/dL    Bilirubin Urine Negative Negative    Ketones, Urine TRACE (A) Negative mg/dL    Specific Gravity, UA 1.025 1.005 - 1.030    Blood, Urine Negative Negative    pH, UA 6.0 5.0 - 8.0    Protein, UA Negative Negative mg/dL    Urobilinogen, Urine 1.0 <2.0 E.U./dL    Nitrite, Urine Negative Negative    Leukocyte Esterase, Urine Negative Negative    Microscopic Examination Not Indicated     Urine Type see below          - Patient seen and evaluated in triage area and later reassessed in T1.  30 y.o. female presented for N/V in first trimester of pregnancy. Patient is nontoxic on exam. VSS. - Pertinent old records reviewed. - Patient was given IVF and zofran in the ED. Upon reassessment, patient reported feeling much better. She ate some crackers and drank soda and kept those down without difficulty. - Diagnostic studies reviewed. Electrolytes unremarkable, Cr WNL. UA showed trace ketone. CBC WNL. - I discussed the results with patient. Reassurance. She states she already has an appointment with her OB on Friday. - Return precautions also discussed. patient verbalized understanding of care plan and agreed to follow-up with OB as advised. I estimate there is LOW risk for SEVERE DEHYDRATION, INTRACTABLE VOMITING, METABOLIC DERANGEMENT or ACUTE PYELONEPHRITIS, thus I consider the discharge disposition reasonable. Nilda Denise and I have discussed the diagnosis and risks, and we agree with discharging home to follow-up with OB. We also discussed returning to the Emergency Department immediately if new or worsening symptoms occur. We have discussed the symptoms which are most concerning (e.g., pain, weakness, vomiting, fever) that necessitate immediate return. Clinical Impression:  1. Nausea/vomiting in pregnancy          Disposition:  Discharge to home in good condition. Blood pressure 124/72, pulse 82, temperature 97.5 °F (36.4 °C), temperature source Oral, resp.  rate 16, height 5' 9\" (1.753 m), weight 206 lb (93.4 kg), last menstrual period 06/10/2021, SpO2 100 %. Patient was given scripts for the following medications. I counseled patient how to take these medications. Discharge Medication List as of 9/14/2021 10:38 AM      START taking these medications    Details   ondansetron (ZOFRAN ODT) 4 MG disintegrating tablet Take 1 tablet by mouth every 8 hours as needed for Nausea or Vomiting, Disp-21 tablet, R-0Normal             Disposition referral (if applicable): Zoila Chopra, [address]  [address] [facility]  [phone]    Schedule an appointment as soon as possible for a visit in 3 days  Please go to your ultrasound appointment on Friday      This chart was generated in part by using Dragon Dictation system and may contain errors related to that system including errors in grammar, punctuation, and spelling, as well as words and phrases that may be inappropriate. If there are any questions or concerns please feel free to contact the dictating provider for clarification.      Chip Ospina MD  [facility] Adiel Salas MD  09/14/21 9871

## 2021-09-15 LAB — URINE CULTURE, ROUTINE: NORMAL

## 2023-11-17 ENCOUNTER — HOSPITAL ENCOUNTER (EMERGENCY)
Age: 28
Discharge: HOME | End: 2023-11-17
Attending: EMERGENCY MEDICINE

## 2023-11-17 VITALS
WEIGHT: 189 LBS | DIASTOLIC BLOOD PRESSURE: 74 MMHG | BODY MASS INDEX: 27.99 KG/M2 | OXYGEN SATURATION: 100 % | HEART RATE: 66 BPM | HEIGHT: 69 IN | RESPIRATION RATE: 16 BRPM | SYSTOLIC BLOOD PRESSURE: 132 MMHG | TEMPERATURE: 97.9 F

## 2023-11-17 DIAGNOSIS — Z20.2 STD EXPOSURE: Primary | ICD-10-CM

## 2023-11-17 DIAGNOSIS — N76.0 BACTERIAL VAGINOSIS: ICD-10-CM

## 2023-11-17 DIAGNOSIS — B96.89 BACTERIAL VAGINOSIS: ICD-10-CM

## 2023-11-17 LAB
BACTERIA GENITAL QL WET PREP: ABNORMAL
BILIRUB UR QL STRIP.AUTO: NEGATIVE
CLARITY UR: CLEAR
CLUE CELLS SPEC QL WET PREP: ABNORMAL
COLOR UR: YELLOW
EPI CELLS SPEC QL WET PREP: ABNORMAL
GLUCOSE UR STRIP.AUTO-MCNC: NEGATIVE MG/DL
HCG UR QL: NEGATIVE
HGB UR QL STRIP.AUTO: NEGATIVE
KETONES UR STRIP.AUTO-MCNC: NEGATIVE MG/DL
LEUKOCYTE ESTERASE UR QL STRIP.AUTO: NEGATIVE
NITRITE UR QL STRIP.AUTO: NEGATIVE
PH UR STRIP.AUTO: 6 [PH] (ref 5–8)
PROT UR STRIP.AUTO-MCNC: NEGATIVE MG/DL
RBC SPEC QL WET PREP: ABNORMAL
SP GR UR STRIP.AUTO: >=1.03 (ref 1–1.03)
SPECIMEN SOURCE FLD: ABNORMAL
T VAGINALIS GENITAL QL WET PREP: ABNORMAL
TRICHOMONAS #/AREA URNS HPF: NORMAL /[HPF]
UA COMPLETE W REFLEX CULTURE PNL UR: NORMAL
UA DIPSTICK W REFLEX MICRO PNL UR: NORMAL
URN SPEC COLLECT METH UR: NORMAL
UROBILINOGEN UR STRIP-ACNC: 1 E.U./DL
WBC SPEC QL WET PREP: ABNORMAL
YEAST GENITAL QL WET PREP: ABNORMAL

## 2023-11-17 PROCEDURE — 99284 EMERGENCY DEPT VISIT MOD MDM: CPT

## 2023-11-17 PROCEDURE — 87210 SMEAR WET MOUNT SALINE/INK: CPT

## 2023-11-17 PROCEDURE — 6360000002 HC RX W HCPCS: Performed by: EMERGENCY MEDICINE

## 2023-11-17 PROCEDURE — 6370000000 HC RX 637 (ALT 250 FOR IP): Performed by: EMERGENCY MEDICINE

## 2023-11-17 PROCEDURE — 87591 N.GONORRHOEAE DNA AMP PROB: CPT

## 2023-11-17 PROCEDURE — 87491 CHLMYD TRACH DNA AMP PROBE: CPT

## 2023-11-17 PROCEDURE — 84703 CHORIONIC GONADOTROPIN ASSAY: CPT

## 2023-11-17 PROCEDURE — 81001 URINALYSIS AUTO W/SCOPE: CPT

## 2023-11-17 PROCEDURE — 96372 THER/PROPH/DIAG INJ SC/IM: CPT

## 2023-11-17 RX ORDER — DOXYCYCLINE HYCLATE 100 MG
100 TABLET ORAL 2 TIMES DAILY
Qty: 14 TABLET | Refills: 0 | Status: SHIPPED | OUTPATIENT
Start: 2023-11-17 | End: 2023-11-24

## 2023-11-17 RX ORDER — AZITHROMYCIN 250 MG/1
1000 TABLET, FILM COATED ORAL ONCE
Status: COMPLETED | OUTPATIENT
Start: 2023-11-17 | End: 2023-11-17

## 2023-11-17 RX ORDER — CEFTRIAXONE 1 G/1
500 INJECTION, POWDER, FOR SOLUTION INTRAMUSCULAR; INTRAVENOUS ONCE
Status: COMPLETED | OUTPATIENT
Start: 2023-11-17 | End: 2023-11-17

## 2023-11-17 RX ORDER — METRONIDAZOLE 250 MG/1
500 TABLET ORAL ONCE
Status: COMPLETED | OUTPATIENT
Start: 2023-11-17 | End: 2023-11-17

## 2023-11-17 RX ORDER — METRONIDAZOLE 500 MG/1
500 TABLET ORAL 2 TIMES DAILY
Qty: 14 TABLET | Refills: 0 | Status: SHIPPED | OUTPATIENT
Start: 2023-11-17 | End: 2023-11-24

## 2023-11-17 RX ADMIN — METRONIDAZOLE 500 MG: 250 TABLET ORAL at 02:35

## 2023-11-17 RX ADMIN — CEFTRIAXONE SODIUM 500 MG: 1 INJECTION, POWDER, FOR SOLUTION INTRAMUSCULAR; INTRAVENOUS at 02:37

## 2023-11-17 RX ADMIN — AZITHROMYCIN 1000 MG: 250 TABLET, FILM COATED ORAL at 02:35

## 2023-11-17 ASSESSMENT — PAIN - FUNCTIONAL ASSESSMENT: PAIN_FUNCTIONAL_ASSESSMENT: NONE - DENIES PAIN

## 2023-11-17 ASSESSMENT — LIFESTYLE VARIABLES
HOW OFTEN DO YOU HAVE A DRINK CONTAINING ALCOHOL: 2-4 TIMES A MONTH
HOW MANY STANDARD DRINKS CONTAINING ALCOHOL DO YOU HAVE ON A TYPICAL DAY: 1 OR 2

## 2023-11-17 NOTE — ED PROVIDER NOTES
[address] [address]          DISCHARGE MEDICATIONS:  Patient was given scripts for the following medications. I counseled patient how to take these medications:  New Prescriptions    DOXYCYCLINE HYCLATE (VIBRA-TABS) 100 MG TABLET    Take 1 tablet by mouth 2 times daily for 7 days    METRONIDAZOLE (FLAGYL) 500 MG TABLET    Take 1 tablet by mouth 2 times daily for 7 days       DISCONTINUED MEDICATIONS:  Discontinued Medications    PRENATAL VIT-FE FUMARATE-FA (PRENATAL PLUS) 27-1 MG TABS    Take 1 tablet by mouth daily              (This chart was generated in part by using Dragon Dictation system and may contain errors related to that system including errors in grammar, punctuation, and spelling, as well as words and phrases that may be inappropriate.  If there are any questions or concerns please feel free to contact the dictating provider for clarification.)    MD Keily Lehman MD  11/17/23 7620

## 2023-11-17 NOTE — ED NOTES
Reviewed discharge information. Patient verbalized understanding of paperwork, medication, and care instructions. Patient denied any questions.       Irene Dawn RN  11/17/23 8696

## 2023-11-20 LAB
C TRACH DNA CVX QL NAA+PROBE: NEGATIVE
N GONORRHOEA DNA CERV MUCUS QL NAA+PROBE: NEGATIVE

## 2024-09-02 ENCOUNTER — HOSPITAL ENCOUNTER (EMERGENCY)
Age: 29
Discharge: HOME | End: 2024-09-02
Payer: MEDICAID

## 2024-09-02 VITALS
SYSTOLIC BLOOD PRESSURE: 130 MMHG | BODY MASS INDEX: 30.29 KG/M2 | WEIGHT: 193 LBS | TEMPERATURE: 98.2 F | DIASTOLIC BLOOD PRESSURE: 85 MMHG | HEIGHT: 67 IN | RESPIRATION RATE: 18 BRPM | OXYGEN SATURATION: 98 % | HEART RATE: 76 BPM

## 2024-09-02 DIAGNOSIS — K08.89 PAIN, DENTAL: Primary | ICD-10-CM

## 2024-09-02 DIAGNOSIS — S02.5XXA CLOSED FRACTURE OF TOOTH, INITIAL ENCOUNTER: ICD-10-CM

## 2024-09-02 PROCEDURE — 6370000000 HC RX 637 (ALT 250 FOR IP): Performed by: PHYSICIAN ASSISTANT

## 2024-09-02 PROCEDURE — 99283 EMERGENCY DEPT VISIT LOW MDM: CPT

## 2024-09-02 RX ORDER — IBUPROFEN 800 MG/1
800 TABLET, FILM COATED ORAL ONCE
Status: COMPLETED | OUTPATIENT
Start: 2024-09-02 | End: 2024-09-02

## 2024-09-02 RX ORDER — PENICILLIN V POTASSIUM 500 MG/1
500 TABLET, FILM COATED ORAL 4 TIMES DAILY
Qty: 28 TABLET | Refills: 0 | Status: SHIPPED | OUTPATIENT
Start: 2024-09-02 | End: 2024-09-09

## 2024-09-02 RX ORDER — PENICILLIN V POTASSIUM 250 MG/1
500 TABLET, FILM COATED ORAL ONCE
Status: COMPLETED | OUTPATIENT
Start: 2024-09-02 | End: 2024-09-02

## 2024-09-02 RX ADMIN — IBUPROFEN 800 MG: 800 TABLET, FILM COATED ORAL at 19:40

## 2024-09-02 RX ADMIN — PENICILLIN V POTASSIUM 500 MG: 250 TABLET, FILM COATED ORAL at 19:40

## 2024-09-02 ASSESSMENT — PAIN - FUNCTIONAL ASSESSMENT: PAIN_FUNCTIONAL_ASSESSMENT: 0-10

## 2024-09-02 ASSESSMENT — PAIN SCALES - GENERAL: PAINLEVEL_OUTOF10: 10

## 2024-09-04 NOTE — ED PROVIDER NOTES
[facility] ED  EMERGENCY DEPARTMENT ENCOUNTER        Pt Name: Nilda Denise  MRN: [id number]  Birthdate 1995  Date of evaluation: 9/2/2024  Provider: Peggy Hanson PA-C  PCP: [facility] Un[facility] (Inactive)  Note Started: 1:47 AM EDT 9/4/24      RUBIA. I have evaluated this patient.        CHIEF COMPLAINT       Chief Complaint   Patient presents with    Dental Pain     Pt broke left lower tooth yesterday. Pt reports pain has been severe today.        HISTORY OF PRESENT ILLNESS: 1 or more Elements     History From: Patient            Chief Complaint: Left lower dental pain    Nilda Denise is a 28 y.o. female who presents with continued and worsening pain to the left lower second molar.  She states she was eating a salad with seeds and her left tooth broke.  She has been having severe pain ever since.  No facial swelling fever trouble swallowing trouble breathing.    Nursing Notes were all reviewed and agreed with or any disagreements were addressed in the HPI.    REVIEW OF SYSTEMS :      Review of Systems    Positives and Pertinent negatives as per HPI.     SURGICAL HISTORY   History reviewed. No pertinent surgical history.    CURRENTMEDICATIONS       Discharge Medication List as of 9/2/2024  7:52 PM          ALLERGIES     Patient has no known allergies.    FAMILYHISTORY     History reviewed. No pertinent family history.     SOCIAL HISTORY       Social History     Tobacco Use    Smoking status: Never    Smokeless tobacco: Never   Vaping Use    Vaping status: Never Used   Substance Use Topics    Alcohol use: Yes     Comment: occasional    Drug use: No       SCREENINGS          Donovan Coma Scale  Eye Opening: Spontaneous  Best Verbal Response: Oriented  Best Motor Response: Obeys commands  [place] Coma Scale Score: 15              CIWA Assessment  BP: 130/85  Pulse: 76             PHYSICAL EXAM  1 or more Elements     ED Triage Vitals [09/02/24 1927]   BP Systolic BP Percentile

## 2024-10-10 ENCOUNTER — HOSPITAL ENCOUNTER (EMERGENCY)
Age: 29
Discharge: HOME | End: 2024-10-10
Payer: OTHER MISCELLANEOUS

## 2024-10-10 VITALS
WEIGHT: 192.2 LBS | DIASTOLIC BLOOD PRESSURE: 67 MMHG | RESPIRATION RATE: 16 BRPM | TEMPERATURE: 97.7 F | OXYGEN SATURATION: 99 % | BODY MASS INDEX: 30.1 KG/M2 | SYSTOLIC BLOOD PRESSURE: 112 MMHG | HEART RATE: 88 BPM
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
## 2024-10-10 DIAGNOSIS — S16.1XXA STRAIN OF NECK MUSCLE, INITIAL ENCOUNTER: Primary | ICD-10-CM

## 2024-10-10 DIAGNOSIS — S39.012A STRAIN OF LUMBAR REGION, INITIAL ENCOUNTER: ICD-10-CM

## 2024-10-10 PROCEDURE — 99283 EMERGENCY DEPT VISIT LOW MDM: CPT

## 2024-10-10 RX ORDER — METHOCARBAMOL 750 MG/1
750 TABLET, FILM COATED ORAL 3 TIMES DAILY PRN
Qty: 15 TABLET | Refills: 0 | Status: SHIPPED | OUTPATIENT
Start: 2024-10-10

## 2024-10-10 ASSESSMENT — PAIN SCALES - GENERAL: PAINLEVEL_OUTOF10: 8

## 2024-10-10 ASSESSMENT — PAIN DESCRIPTION - LOCATION: LOCATION: NECK

## 2024-10-11 NOTE — DISCHARGE INSTRUCTIONS
Apply ice or moist heat to the affected areas for 20 minutes every hour as needed for pain.  Tylenol and/or Motrin as per package instructions for pain.  You will be stiff and sore tomorrow.  This should decrease gradually over the next few days.  Do not drive, work, or operate machinery while taking Robaxin.  Contact your family doctor or listed primary care group tomorrow to set up follow-up.  Return immediately for new complaints or worsening.
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
## 2024-10-24 ENCOUNTER — APPOINTMENT (OUTPATIENT)
Dept: ULTRASOUND IMAGING | Age: 29
End: 2024-10-24
Payer: MEDICAID

## 2024-10-24 ENCOUNTER — HOSPITAL ENCOUNTER (EMERGENCY)
Age: 29
Discharge: HOME | End: 2024-10-24
Attending: EMERGENCY MEDICINE
Payer: MEDICAID

## 2024-10-24 VITALS
HEIGHT: 67 IN | BODY MASS INDEX: 31.39 KG/M2 | HEART RATE: 82 BPM | DIASTOLIC BLOOD PRESSURE: 50 MMHG | WEIGHT: 200 LBS | TEMPERATURE: 98.1 F | OXYGEN SATURATION: 100 % | SYSTOLIC BLOOD PRESSURE: 105 MMHG | RESPIRATION RATE: 16 BRPM

## 2024-10-24 DIAGNOSIS — O20.0 THREATENED MISCARRIAGE: Primary | ICD-10-CM

## 2024-10-24 DIAGNOSIS — Z29.13 NEED FOR RHOGAM DUE TO RH NEGATIVE MOTHER: ICD-10-CM

## 2024-10-24 LAB
ABO + RH BLD: NORMAL
ANION GAP SERPL CALCULATED.3IONS-SCNC: 13 MMOL/L (ref 3–16)
B-HCG SERPL EIA 3RD IS-ACNC: 2589 MIU/ML
BACTERIA URNS QL MICRO: ABNORMAL /HPF
BASOPHILS # BLD: 0.1 K/UL (ref 0–0.2)
BASOPHILS NFR BLD: 0.9 %
BILIRUB UR QL STRIP.AUTO: NEGATIVE
BUN SERPL-MCNC: 9 MG/DL (ref 7–20)
CALCIUM SERPL-MCNC: 9.1 MG/DL (ref 8.3–10.6)
CHLORIDE SERPL-SCNC: 101 MMOL/L (ref 99–110)
CLARITY UR: ABNORMAL
CO2 SERPL-SCNC: 22 MMOL/L (ref 21–32)
COLOR UR: ABNORMAL
CREAT SERPL-MCNC: 0.7 MG/DL (ref 0.6–1.1)
DEPRECATED RDW RBC AUTO: 13.5 % (ref 12.4–15.4)
EOSINOPHIL # BLD: 0.2 K/UL (ref 0–0.6)
EOSINOPHIL NFR BLD: 3.1 %
EPI CELLS #/AREA URNS HPF: ABNORMAL /HPF (ref 0–5)
GFR SERPLBLD CREATININE-BSD FMLA CKD-EPI: >90 ML/MIN/{1.73_M2}
GLUCOSE SERPL-MCNC: 81 MG/DL (ref 70–99)
GLUCOSE UR STRIP.AUTO-MCNC: NEGATIVE MG/DL
HCT VFR BLD AUTO: 44.3 % (ref 36–48)
HGB BLD-MCNC: 14.7 G/DL (ref 12–16)
HGB UR QL STRIP.AUTO: ABNORMAL
KETONES UR STRIP.AUTO-MCNC: NEGATIVE MG/DL
LEUKOCYTE ESTERASE UR QL STRIP.AUTO: ABNORMAL
LYMPHOCYTES # BLD: 2.3 K/UL (ref 1–5.1)
LYMPHOCYTES NFR BLD: 35.8 %
MCH RBC QN AUTO: 31.7 PG (ref 26–34)
MCHC RBC AUTO-ENTMCNC: 33.3 G/DL (ref 31–36)
MCV RBC AUTO: 95.2 FL (ref 80–100)
MONOCYTES # BLD: 0.5 K/UL (ref 0–1.3)
MONOCYTES NFR BLD: 7.4 %
NEUTROPHILS # BLD: 3.3 K/UL (ref 1.7–7.7)
NEUTROPHILS NFR BLD: 52.8 %
NITRITE UR QL STRIP.AUTO: NEGATIVE
PH UR STRIP.AUTO: 7 [PH] (ref 5–8)
PLATELET # BLD AUTO: 343 K/UL (ref 135–450)
PMV BLD AUTO: 7.8 FL (ref 5–10.5)
POTASSIUM SERPL-SCNC: 3.6 MMOL/L (ref 3.5–5.1)
PROT UR STRIP.AUTO-MCNC: 100 MG/DL
RBC # BLD AUTO: 4.65 M/UL (ref 4–5.2)
RBC #/AREA URNS HPF: >100 /HPF (ref 0–4)
RHIG LOT NUMBER: NORMAL
SODIUM SERPL-SCNC: 136 MMOL/L (ref 136–145)
SP GR UR STRIP.AUTO: 1.02 (ref 1–1.03)
UA COMPLETE W REFLEX CULTURE PNL UR: ABNORMAL
UA DIPSTICK W REFLEX MICRO PNL UR: YES
URN SPEC COLLECT METH UR: ABNORMAL
UROBILINOGEN UR STRIP-ACNC: 1 E.U./DL
WBC # BLD AUTO: 6.3 K/UL (ref 4–11)
WBC #/AREA URNS HPF: ABNORMAL /HPF (ref 0–5)

## 2024-10-24 PROCEDURE — 80048 BASIC METABOLIC PNL TOTAL CA: CPT

## 2024-10-24 PROCEDURE — 86901 BLOOD TYPING SEROLOGIC RH(D): CPT

## 2024-10-24 PROCEDURE — 76817 TRANSVAGINAL US OBSTETRIC: CPT

## 2024-10-24 PROCEDURE — 6360000002 HC RX W HCPCS: Performed by: EMERGENCY MEDICINE

## 2024-10-24 PROCEDURE — 85025 COMPLETE CBC W/AUTO DIFF WBC: CPT

## 2024-10-24 PROCEDURE — 84702 CHORIONIC GONADOTROPIN TEST: CPT

## 2024-10-24 PROCEDURE — 96372 THER/PROPH/DIAG INJ SC/IM: CPT

## 2024-10-24 PROCEDURE — 81001 URINALYSIS AUTO W/SCOPE: CPT

## 2024-10-24 PROCEDURE — 99284 EMERGENCY DEPT VISIT MOD MDM: CPT

## 2024-10-24 PROCEDURE — 96374 THER/PROPH/DIAG INJ IV PUSH: CPT

## 2024-10-24 PROCEDURE — 86900 BLOOD TYPING SEROLOGIC ABO: CPT

## 2024-10-24 PROCEDURE — 76801 OB US < 14 WKS SINGLE FETUS: CPT

## 2024-10-24 RX ORDER — KETOROLAC TROMETHAMINE 15 MG/ML
15 INJECTION, SOLUTION INTRAMUSCULAR; INTRAVENOUS ONCE
Status: COMPLETED | OUTPATIENT
Start: 2024-10-24 | End: 2024-10-24

## 2024-10-24 RX ADMIN — KETOROLAC TROMETHAMINE 15 MG: 15 INJECTION, SOLUTION INTRAMUSCULAR; INTRAVENOUS at 15:11

## 2024-10-24 ASSESSMENT — PAIN SCALES - GENERAL: PAINLEVEL_OUTOF10: 10

## 2024-10-24 ASSESSMENT — PAIN - FUNCTIONAL ASSESSMENT: PAIN_FUNCTIONAL_ASSESSMENT: 0-10

## 2024-10-24 NOTE — ED PROVIDER NOTES
Culture Not Indicated    Microscopic Urinalysis   Result Value Ref Range    WBC, UA 0-2 0 - 5 /HPF    RBC, UA >100 (A) 0 - 4 /HPF    Epithelial Cells, UA 2-5 0 - 5 /HPF    Bacteria, UA Rare (A) None Seen /HPF   ABO/RH   Result Value Ref Range    ABO/Rh B NEG              RADIOLOGY  X-RAYS: ALL IMAGES INCLUDING PLAIN FILMS, CT, ULTRASOUND AND MRI HAVE BEEN READ BY THE RADIOLOGIST.   US OB LESS THAN 14 WEEKS SINGLE OR FIRST GESTATION   Final Result   1. No evidence of intrauterine pregnancy.   2. Given the patient's beta HCG level and dates, a gestational sac should be   identified at this stage of gestation.  A failed 1st trimester pregnancy may   be suspected.   3. No evidence of ectopic pregnancy.  Recommend continued obstetric follow-up   and serial beta HCG levels.         US OB TRANSVAGINAL   Final Result   1. No evidence of intrauterine pregnancy.   2. Given the patient's beta HCG level and dates, a gestational sac should be   identified at this stage of gestation.  A failed 1st trimester pregnancy may   be suspected.   3. No evidence of ectopic pregnancy.  Recommend continued obstetric follow-up   and serial beta HCG levels.                  I have personally reviewed Xray and Ultrasound images and radiology confirms the interpretation:  Pelvic ultrasound with no evidence of an intrauterine pregnancy.  No evidence of ectopic pregnancy.      Medications administered. (Dose and Route):  Rhogam 300mcg IM        Sepsis:  Is this patient to be included in the SEP-1 Core Measure due to severe sepsis or septic shock?   No     Exclusion criteria - the patient is NOT to be included for SEP-1 Core Measure due to:  Infection is not suspected             ED COURSE/MDM:  Patient seen and evaluated. Here the patient is afebrile with normal vitals signs.     Old records reviewed: none    Diagnoses affirmatively addressed, consideration of tests, treatments & admission, including shared decision making:    Here the patient is

## 2024-10-24 NOTE — DISCHARGE INSTRUCTIONS
You are likely having a miscarriage.  We do not see a pregnancy on your ultrasound today.  You need to have your hormone levels rechecked in 48 hours as we discussed.  Since it will be on the weekend I recommend coming to the ER to have your hCG level checked.  If in the interim you feel lightheaded or like you are going to pass out or your bleeding increases to more than 1 pad per hour you should return to the ER immediately.  You can alternate Tylenol and ibuprofen at home.  Drink plenty of fluids.  Regardless I would like you to be seen by your OB/GYN next week.  You were given a RhoGAM injection here send your blood type is B-.

## 2024-12-06 ENCOUNTER — HOSPITAL ENCOUNTER (EMERGENCY)
Age: 29
Discharge: HOME | End: 2024-12-06
Payer: OTHER MISCELLANEOUS

## 2024-12-06 VITALS
OXYGEN SATURATION: 100 % | DIASTOLIC BLOOD PRESSURE: 82 MMHG | HEART RATE: 87 BPM | SYSTOLIC BLOOD PRESSURE: 110 MMHG | RESPIRATION RATE: 16 BRPM | TEMPERATURE: 97.5 F

## 2024-12-06 DIAGNOSIS — S46.911A SHOULDER STRAIN, RIGHT, INITIAL ENCOUNTER: ICD-10-CM
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
## 2024-12-06 DIAGNOSIS — R51.9 ACUTE NONINTRACTABLE HEADACHE, UNSPECIFIED HEADACHE TYPE: ICD-10-CM

## 2024-12-06 PROCEDURE — 96372 THER/PROPH/DIAG INJ SC/IM: CPT

## 2024-12-06 PROCEDURE — 6370000000 HC RX 637 (ALT 250 FOR IP): Performed by: REGISTERED NURSE

## 2024-12-06 PROCEDURE — 99284 EMERGENCY DEPT VISIT MOD MDM: CPT

## 2024-12-06 PROCEDURE — 6360000002 HC RX W HCPCS: Performed by: REGISTERED NURSE

## 2024-12-06 RX ORDER — METHOCARBAMOL 750 MG/1
750 TABLET, FILM COATED ORAL 4 TIMES DAILY
Qty: 40 TABLET | Refills: 0 | Status: SHIPPED | OUTPATIENT
Start: 2024-12-06 | End: 2024-12-16

## 2024-12-06 RX ORDER — ONDANSETRON 4 MG/1
4 TABLET, ORALLY DISINTEGRATING ORAL ONCE
Status: COMPLETED | OUTPATIENT
Start: 2024-12-06 | End: 2024-12-06

## 2024-12-06 RX ORDER — LIDOCAINE 4 G/G
1 PATCH TOPICAL DAILY
Qty: 30 PATCH | Refills: 0 | Status: SHIPPED | OUTPATIENT
Start: 2024-12-06 | End: 2025-01-05

## 2024-12-06 RX ORDER — LIDOCAINE 4 G/G
1 PATCH TOPICAL ONCE
Status: DISCONTINUED | OUTPATIENT
Start: 2024-12-06 | End: 2024-12-06 | Stop reason: HOSPADM

## 2024-12-06 RX ORDER — BUTALBITAL, ACETAMINOPHEN AND CAFFEINE 50; 325; 40 MG/1; MG/1; MG/1
1 TABLET ORAL EVERY 6 HOURS PRN
Qty: 8 TABLET | Refills: 0 | Status: SHIPPED | OUTPATIENT
Start: 2024-12-06 | End: 2024-12-08

## 2024-12-06 RX ORDER — KETOROLAC TROMETHAMINE 15 MG/ML
30 INJECTION, SOLUTION INTRAMUSCULAR; INTRAVENOUS ONCE
Status: COMPLETED | OUTPATIENT
Start: 2024-12-06 | End: 2024-12-06

## 2024-12-06 RX ADMIN — ONDANSETRON 4 MG: 4 TABLET, ORALLY DISINTEGRATING ORAL at 19:31

## 2024-12-06 RX ADMIN — KETOROLAC TROMETHAMINE 30 MG: 15 INJECTION, SOLUTION INTRAMUSCULAR; INTRAVENOUS at 19:27

## 2024-12-06 ASSESSMENT — ENCOUNTER SYMPTOMS
PHOTOPHOBIA: 0
COLOR CHANGE: 0
DIARRHEA: 0
EYE PAIN: 0
SHORTNESS OF BREATH: 0
EYE REDNESS: 0
CONSTIPATION: 0
VOMITING: 0
EYE DISCHARGE: 0
EYE ITCHING: 0
CHEST TIGHTNESS: 0
BLOOD IN STOOL: 0
BACK PAIN: 0
NAUSEA: 1
ABDOMINAL PAIN: 0

## 2024-12-06 ASSESSMENT — PAIN SCALES - GENERAL
PAINLEVEL_OUTOF10: 6
PAINLEVEL_OUTOF10: 5
PAINLEVEL_OUTOF10: 6

## 2024-12-06 ASSESSMENT — PAIN - FUNCTIONAL ASSESSMENT
PAIN_FUNCTIONAL_ASSESSMENT: ACTIVITIES ARE NOT PREVENTED
PAIN_FUNCTIONAL_ASSESSMENT: 0-10

## 2024-12-06 ASSESSMENT — PAIN DESCRIPTION - ORIENTATION
ORIENTATION: LOWER
ORIENTATION: LOWER

## 2024-12-06 ASSESSMENT — PAIN DESCRIPTION - LOCATION
LOCATION: BACK
LOCATION: BACK
LOCATION: HEAD;BACK

## 2024-12-06 ASSESSMENT — PAIN DESCRIPTION - DESCRIPTORS
DESCRIPTORS: ACHING
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
## 2024-12-07 NOTE — DISCHARGE INSTRUCTIONS
Alternate Tylenol and Ibuprofen for pain. You can use lidocaine patches for pain as well as the Robaxin for a muscle relaxant. You can use the Fioricet for your headache. Follow up with the provided referral for a PCP for re-evaluation.

## 2025-01-24 ENCOUNTER — HOSPITAL ENCOUNTER (EMERGENCY)
Age: 30
Discharge: HOME | End: 2025-01-24
Attending: EMERGENCY MEDICINE
Payer: COMMERCIAL

## 2025-01-24 VITALS
OXYGEN SATURATION: 100 % | HEART RATE: 70 BPM | TEMPERATURE: 98 F | SYSTOLIC BLOOD PRESSURE: 108 MMHG | DIASTOLIC BLOOD PRESSURE: 71 MMHG | RESPIRATION RATE: 18 BRPM

## 2025-01-24 DIAGNOSIS — S39.012A LUMBAR STRAIN, INITIAL ENCOUNTER: Primary | ICD-10-CM

## 2025-01-24 PROCEDURE — 99283 EMERGENCY DEPT VISIT LOW MDM: CPT

## 2025-01-24 PROCEDURE — 6370000000 HC RX 637 (ALT 250 FOR IP): Performed by: NURSE PRACTITIONER

## 2025-01-24 RX ORDER — PREDNISONE 20 MG/1
TABLET ORAL
Qty: 18 TABLET | Refills: 0 | Status: SHIPPED | OUTPATIENT
Start: 2025-01-24

## 2025-01-24 RX ORDER — LIDOCAINE 4 G/G
1 PATCH TOPICAL ONCE
Status: DISCONTINUED | OUTPATIENT
Start: 2025-01-24 | End: 2025-01-24 | Stop reason: HOSPADM

## 2025-01-24 RX ORDER — ACETAMINOPHEN 325 MG/1
650 TABLET ORAL ONCE
Status: COMPLETED | OUTPATIENT
Start: 2025-01-24 | End: 2025-01-24

## 2025-01-24 RX ORDER — METHOCARBAMOL 500 MG/1
500 TABLET, FILM COATED ORAL ONCE
Status: COMPLETED | OUTPATIENT
Start: 2025-01-24 | End: 2025-01-24

## 2025-01-24 RX ORDER — METHOCARBAMOL 750 MG/1
750 TABLET, FILM COATED ORAL 4 TIMES DAILY
Qty: 40 TABLET | Refills: 0 | Status: SHIPPED | OUTPATIENT
Start: 2025-01-24 | End: 2025-02-03

## 2025-01-24 RX ORDER — LIDOCAINE 50 MG/G
1 PATCH TOPICAL DAILY
Qty: 10 PATCH | Refills: 0 | Status: SHIPPED | OUTPATIENT
Start: 2025-01-24 | End: 2025-02-03

## 2025-01-24 RX ORDER — PREDNISONE 20 MG/1
60 TABLET ORAL ONCE
Status: COMPLETED | OUTPATIENT
Start: 2025-01-24 | End: 2025-01-24

## 2025-01-24 RX ADMIN — METHOCARBAMOL TABLETS 500 MG: 500 TABLET, COATED ORAL at 09:43

## 2025-01-24 RX ADMIN — PREDNISONE 60 MG: 20 TABLET ORAL at 09:42

## 2025-01-24 RX ADMIN — ACETAMINOPHEN 650 MG: 325 TABLET ORAL at 09:43

## 2025-01-24 ASSESSMENT — ENCOUNTER SYMPTOMS
RHINORRHEA: 0
SORE THROAT: 0
ABDOMINAL PAIN: 0
SHORTNESS OF BREATH: 0
COLOR CHANGE: 0
BACK PAIN: 1
FACIAL SWELLING: 0

## 2025-01-24 ASSESSMENT — PAIN - FUNCTIONAL ASSESSMENT: PAIN_FUNCTIONAL_ASSESSMENT: 0-10

## 2025-01-24 ASSESSMENT — PAIN SCALES - GENERAL: PAINLEVEL_OUTOF10: 10

## 2025-01-24 NOTE — ED PROVIDER NOTES
[facility] EMERGENCY DEPARTMENT  EMERGENCY DEPARTMENT ENCOUNTER      I am the Primary Clinician of Record    Note started: 9:48 AM EST 1/24/25    RUBIA. I have evaluated this patient.          Pt Name: Nilda Denise  MRN: [id number]  Birthdate 1995  Dateof evaluation: 1/24/2025  Provider: Tosin Ivy APRN - CNP  PCP: [facility] (Inactive)  ED Attending: No att. providers found      CHIEF COMPLAINT       Chief Complaint   Patient presents with    Back Pain     Injured back at work       HISTORY OF PRESENTILLNESS   (Location/Symptom, Timing/Onset, Context/Setting, Quality, Duration, Modifying Factors, Severity)  Note limiting factors.     Nilda Denise is a 29 y.o. female for low back pain. Onset was today.  Context includes patient reports that she slipped at work approximately 1 week ago on a wet surface and had some low back pain.  Patient reports that her employer put her on a different job which included grocery shopping and lifting heavy items.  Patient reports she was lifting lots of heavy items yesterday and today has had exacerbation of her back pain and generalized bodyaches.  She denies an actual fall yesterday. Alleviating factors include nothing.  Aggravating factors include nothing. Pain is 10/10.  Nothing has been used for pain today.  Patient denies any chance of pregnancy    Nursing Notes were all reviewed and agreed with or any disagreements were addressed  in the HPI.      REVIEW OF SYSTEMS       Review of Systems   Constitutional:  Negative for activity change, appetite change and fever.   HENT:  Negative for congestion, facial swelling, rhinorrhea and sore throat.    Eyes:  Negative for visual disturbance.   Respiratory:  Negative for shortness of breath.    Cardiovascular:  Negative for chest pain.   Gastrointestinal:  Negative for abdominal pain.   Genitourinary:  Negative for difficulty urinating.   Musculoskeletal:  Positive for back pain and  or septic shock?   No     Exclusion criteria - the patient is NOT to be included for SEP-1 Core Measure due to:  Infection is not suspected         The patient tolerated their visit well. I have evaluated this patient. My supervising physician was available for consultation. The patient and / or the family were informed of the results of any tests, a time was given to answer questions, a plan was proposed and they agreed with plan.        FINAL IMPRESSION      1. Lumbar strain, initial encounter          DISPOSITION/PLAN   DISPOSITION Decision To Discharge 01/24/2025 09:33:45 AM   DISPOSITION CONDITION Undetermined           PATIENT REFERRED TO:  [facility]  [address]  [address]  [facility] [address]  [phone]  Schedule an appointment as soon as possible for a visit in 2 days  to establish primary care    [facility]  [address] [facility] [address] [phone]    If symptoms worsen    [facility] [phone]  [address] [place], [place] [id number] [phone]  Call   to be re evaluated.    [facility]  [address] [id number] [phone]  Schedule an appointment as soon as possible for a visit         DISCHARGE MEDICATIONS:  New Prescriptions    LIDOCAINE (LIDODERM) 5 %    Place 1 patch onto the skin daily for 10 days 12 hours on, 12 hours off.    METHOCARBAMOL (ROBAXIN-750) 750 MG TABLET    Take 1 tablet by mouth 4 times daily for 10 days    PREDNISONE (DELTASONE) 20 MG TABLET    Take 3 tabs orally daily for 3 days, then take 2 tabs orally for 3 days then take 1 tab orally for 3 days.       DISCONTINUED MEDICATIONS:  Discontinued Medications    No medications on file              (Please note that portions of this note were completed with a voice recognition program.  Efforts were made to edit the dictations but occasionally words are mis-transcribed.)    Patient was seen during the time